# Patient Record
Sex: FEMALE | Race: ASIAN | NOT HISPANIC OR LATINO | URBAN - METROPOLITAN AREA
[De-identification: names, ages, dates, MRNs, and addresses within clinical notes are randomized per-mention and may not be internally consistent; named-entity substitution may affect disease eponyms.]

---

## 2019-01-13 ENCOUNTER — WALK IN (OUTPATIENT)
Dept: URGENT CARE | Age: 29
End: 2019-01-13

## 2019-01-13 DIAGNOSIS — H93.8X1 CLOGGED EAR, RIGHT: Primary | ICD-10-CM

## 2019-01-13 DIAGNOSIS — J06.9 ACUTE URI: ICD-10-CM

## 2019-01-13 DIAGNOSIS — H61.20 IMPACTED CERUMEN, UNSPECIFIED LATERALITY: ICD-10-CM

## 2019-01-13 PROCEDURE — 99204 OFFICE O/P NEW MOD 45 MIN: CPT | Performed by: PEDIATRICS

## 2019-01-13 ASSESSMENT — PAIN SCALES - GENERAL: PAINLEVEL: 3-4

## 2020-01-27 ENCOUNTER — OFFICE VISIT (OUTPATIENT)
Dept: FAMILY MEDICINE CLINIC | Facility: CLINIC | Age: 30
End: 2020-01-27
Payer: COMMERCIAL

## 2020-01-27 VITALS
WEIGHT: 93 LBS | BODY MASS INDEX: 18.75 KG/M2 | HEIGHT: 59.25 IN | DIASTOLIC BLOOD PRESSURE: 60 MMHG | TEMPERATURE: 98 F | HEART RATE: 100 BPM | RESPIRATION RATE: 16 BRPM | SYSTOLIC BLOOD PRESSURE: 90 MMHG

## 2020-01-27 DIAGNOSIS — N30.01 ACUTE CYSTITIS WITH HEMATURIA: Primary | ICD-10-CM

## 2020-01-27 DIAGNOSIS — R35.0 URINE FREQUENCY: ICD-10-CM

## 2020-01-27 LAB
BILIRUBIN: NEGATIVE
GLUCOSE (URINE DIPSTICK): NEGATIVE MG/DL
KETONES (URINE DIPSTICK): NEGATIVE MG/DL
MULTISTIX LOT#: ABNORMAL NUMERIC
NITRITE, URINE: POSITIVE
PH, URINE: 6 (ref 4.5–8)
PROTEIN (URINE DIPSTICK): 300 MG/DL
SPECIFIC GRAVITY: 1.02 (ref 1–1.03)
URINE-COLOR: YELLOW
UROBILINOGEN,SEMI-QN: 0.2 MG/DL (ref 0–1.9)

## 2020-01-27 PROCEDURE — 87086 URINE CULTURE/COLONY COUNT: CPT | Performed by: FAMILY MEDICINE

## 2020-01-27 PROCEDURE — 87186 SC STD MICRODIL/AGAR DIL: CPT | Performed by: FAMILY MEDICINE

## 2020-01-27 PROCEDURE — 81003 URINALYSIS AUTO W/O SCOPE: CPT | Performed by: FAMILY MEDICINE

## 2020-01-27 PROCEDURE — 87077 CULTURE AEROBIC IDENTIFY: CPT | Performed by: FAMILY MEDICINE

## 2020-01-27 PROCEDURE — 99203 OFFICE O/P NEW LOW 30 MIN: CPT | Performed by: FAMILY MEDICINE

## 2020-01-27 RX ORDER — NITROFURANTOIN 25; 75 MG/1; MG/1
100 CAPSULE ORAL 2 TIMES DAILY
Qty: 10 CAPSULE | Refills: 0 | Status: SHIPPED | OUTPATIENT
Start: 2020-01-27 | End: 2020-02-11 | Stop reason: ALTCHOICE

## 2020-01-27 NOTE — PROGRESS NOTES
Chief Complaint:  Patient presents with:  Establish Care: New Pt  Urinary Symptoms: Urine Frequency and Burning Sensation     HPI:  This is a 30year old female patient presenting for Establish Care (New Pt) and Urinary Symptoms (Urine Frequency and Burnin rebound tenderness  EXTREMITIES: warm and well perfused  PSYCH: pleasant and cooperative, no obvious depression or anxiety    Results for orders placed or performed in visit on 01/27/20   URINALYSIS, AUTO, W/O SCOPE   Result Value Ref Range    Glucose Urin

## 2020-01-28 ENCOUNTER — TELEPHONE (OUTPATIENT)
Dept: FAMILY MEDICINE CLINIC | Facility: CLINIC | Age: 30
End: 2020-01-28

## 2020-01-28 NOTE — TELEPHONE ENCOUNTER
Patient was seen yesterday and prescribed an antibiotic but did not inform Dr. Martha Driver that she was breastfeeding and according to the pharmacist she should not take this, looking for a different antibiotic

## 2020-01-28 NOTE — TELEPHONE ENCOUNTER
Reviewed the literature again and this is ok to use while breast feeding as long as infant is >3month old. If she is still uncomfortable with this, please let me know.    Tish Luz, DO

## 2020-02-11 ENCOUNTER — OFFICE VISIT (OUTPATIENT)
Dept: FAMILY MEDICINE CLINIC | Facility: CLINIC | Age: 30
End: 2020-02-11
Payer: COMMERCIAL

## 2020-02-11 VITALS
RESPIRATION RATE: 16 BRPM | WEIGHT: 92 LBS | SYSTOLIC BLOOD PRESSURE: 96 MMHG | TEMPERATURE: 98 F | HEART RATE: 80 BPM | BODY MASS INDEX: 18.3 KG/M2 | HEIGHT: 59.5 IN | DIASTOLIC BLOOD PRESSURE: 60 MMHG

## 2020-02-11 DIAGNOSIS — L04.9 LYMPHADENITIS, ACUTE: Primary | ICD-10-CM

## 2020-02-11 PROCEDURE — 99214 OFFICE O/P EST MOD 30 MIN: CPT | Performed by: FAMILY MEDICINE

## 2020-02-11 RX ORDER — AMOXICILLIN 500 MG/1
500 CAPSULE ORAL 2 TIMES DAILY
Qty: 20 CAPSULE | Refills: 0 | Status: SHIPPED | OUTPATIENT
Start: 2020-02-11 | End: 2020-02-21

## 2020-02-11 NOTE — PROGRESS NOTES
Chief Complaint:  Patient presents with:  Lymph Node: x 4 days Swollen Lymph on Left Side of Neck     HPI:  This is a 30year old female patient presenting for Lymph Node (x 4 days Swollen Lymph on Left Side of Neck )    Symptoms started 4 days ago.  Noted wheezes, rales or rhonchi, good air movement  CV: HRRR, no murmurs, no peripheral edema   EXTREMITIES: warm and well perfused  PSYCH: pleasant and cooperative, no obvious depression or anxiety    ASSESSMENT AND PLAN:  Discussed the following assessment   P

## 2020-03-02 ENCOUNTER — OFFICE VISIT (OUTPATIENT)
Dept: FAMILY MEDICINE CLINIC | Facility: CLINIC | Age: 30
End: 2020-03-02
Payer: COMMERCIAL

## 2020-03-02 VITALS
OXYGEN SATURATION: 98 % | TEMPERATURE: 98 F | BODY MASS INDEX: 18.5 KG/M2 | RESPIRATION RATE: 16 BRPM | WEIGHT: 93 LBS | HEIGHT: 59.5 IN | DIASTOLIC BLOOD PRESSURE: 58 MMHG | HEART RATE: 82 BPM | SYSTOLIC BLOOD PRESSURE: 94 MMHG

## 2020-03-02 DIAGNOSIS — J06.9 ACUTE URI: Primary | ICD-10-CM

## 2020-03-02 PROCEDURE — 99213 OFFICE O/P EST LOW 20 MIN: CPT | Performed by: NURSE PRACTITIONER

## 2020-03-02 NOTE — PATIENT INSTRUCTIONS
Gargle with warm salt water solution 3-5 times daily. Dissolve 1/2 teaspoon salt in half cup of warm tap water. Gargle and spit.      Try a premixed saline nasal spray, available over the counter, such as Barton Nasal Spray (or generic equi

## 2020-03-02 NOTE — PROGRESS NOTES
Patient presents with:  Cough: is till coughing, has a sore throat, rigth ear feels closed       HPI:  Presents with 7 day history of right ear pressure (w/o pain), cough with production of clear/white colored sputum, sinus congestion, sore throat \"from c No rash noted. No erythema. No pallor. A/P:    Acute uri  (primary encounter diagnosis)- likely viral illness, already reporting some improvement.  Do routine nasal saline sinus rinses, warm salt water gargles, as per patient instructions, along with burnett

## 2021-01-13 ENCOUNTER — TELEPHONE (OUTPATIENT)
Dept: FAMILY MEDICINE CLINIC | Facility: CLINIC | Age: 31
End: 2021-01-13

## 2021-01-13 RX ORDER — AMOXICILLIN 500 MG/1
500 CAPSULE ORAL 3 TIMES DAILY
COMMUNITY
Start: 2020-12-17 | End: 2021-01-18 | Stop reason: ALTCHOICE

## 2021-01-13 NOTE — TELEPHONE ENCOUNTER
Saw MD in [place] x 1 month ago , given amoxicillin x 10 days  Left side ear infection    Pt feels like ear ache is continuing and would like to see MD  Made apt for OV 1/18 with Anderson

## 2021-01-13 NOTE — TELEPHONE ENCOUNTER
Called [facility] to call back about visit she scheduled.  It does not look like we have seen her from 03/20/2020

## 2021-01-18 ENCOUNTER — OFFICE VISIT (OUTPATIENT)
Dept: FAMILY MEDICINE CLINIC | Facility: CLINIC | Age: 31
End: 2021-01-18
Payer: COMMERCIAL

## 2021-01-18 VITALS
RESPIRATION RATE: 14 BRPM | WEIGHT: 102 LBS | BODY MASS INDEX: 20.56 KG/M2 | OXYGEN SATURATION: 97 % | HEIGHT: 59 IN | DIASTOLIC BLOOD PRESSURE: 60 MMHG | TEMPERATURE: 98 F | HEART RATE: 94 BPM | SYSTOLIC BLOOD PRESSURE: 108 MMHG

## 2021-01-18 DIAGNOSIS — H65.93 MUCOID OTITIS MEDIA OF BOTH EARS WITH EFFUSION: Primary | ICD-10-CM

## 2021-01-18 PROCEDURE — 3074F SYST BP LT 130 MM HG: CPT | Performed by: FAMILY MEDICINE

## 2021-01-18 PROCEDURE — 3008F BODY MASS INDEX DOCD: CPT | Performed by: FAMILY MEDICINE

## 2021-01-18 PROCEDURE — 3078F DIAST BP <80 MM HG: CPT | Performed by: FAMILY MEDICINE

## 2021-01-18 PROCEDURE — 99213 OFFICE O/P EST LOW 20 MIN: CPT | Performed by: FAMILY MEDICINE

## 2021-01-18 NOTE — PROGRESS NOTES
Chief Complaint:  Patient presents with:  Ear Problem: Feeling full in both ears worse on the Left Ear, was given Amoxicillin x 1 month ago in [place]    HPI:  This is a 35year old female patient presenting for Ear Problem (Feeling full in both ears worse on movement  CV: HRRR, no murmurs, no peripheral edema   EXTREMITIES: warm and well perfused  PSYCH: pleasant and cooperative, no obvious depression or anxiety    ASSESSMENT AND PLAN:  Discussed the following assessment   Problem List Items Addressed This [facility]

## 2021-02-20 ENCOUNTER — OFFICE VISIT (OUTPATIENT)
Dept: FAMILY MEDICINE CLINIC | Facility: CLINIC | Age: 31
End: 2021-02-20
Payer: COMMERCIAL

## 2021-02-20 VITALS
HEIGHT: 59.5 IN | TEMPERATURE: 99 F | RESPIRATION RATE: 14 BRPM | HEART RATE: 92 BPM | DIASTOLIC BLOOD PRESSURE: 70 MMHG | SYSTOLIC BLOOD PRESSURE: 104 MMHG | WEIGHT: 101 LBS | BODY MASS INDEX: 20.09 KG/M2

## 2021-02-20 DIAGNOSIS — R19.09 GROIN LUMP: Primary | ICD-10-CM

## 2021-02-20 PROCEDURE — 3078F DIAST BP <80 MM HG: CPT | Performed by: FAMILY MEDICINE

## 2021-02-20 PROCEDURE — 99213 OFFICE O/P EST LOW 20 MIN: CPT | Performed by: FAMILY MEDICINE

## 2021-02-20 PROCEDURE — 3074F SYST BP LT 130 MM HG: CPT | Performed by: FAMILY MEDICINE

## 2021-02-20 PROCEDURE — 3008F BODY MASS INDEX DOCD: CPT | Performed by: FAMILY MEDICINE

## 2021-02-20 NOTE — PROGRESS NOTES
Chief Complaint:  Patient presents with:  Bump: Pt c/o of bump underneath skin for 2 months. Painful to touch    HPI:  This is a 35year old female patient presenting for Bump (Pt c/o of bump underneath skin for 2 months.   Painful to touch)    Present for or anxiety    ASSESSMENT AND PLAN:  Discussed the following assessment   Problem List Items Addressed This Visit     None      Visit Diagnoses     Groin lump    -  Primary    Relevant Orders    US GROIN LEFT LIMITED SH(CPT=76882)          Advised the follo

## 2021-03-26 ENCOUNTER — TELEPHONE (OUTPATIENT)
Dept: FAMILY MEDICINE CLINIC | Facility: CLINIC | Age: 31
End: 2021-03-26

## 2021-03-31 NOTE — TELEPHONE ENCOUNTER
Left message for patient to call us back so we may assist with scheduling patients appointment for physical and pap

## 2021-05-26 VITALS
DIASTOLIC BLOOD PRESSURE: 72 MMHG | RESPIRATION RATE: 18 BRPM | HEART RATE: 94 BPM | SYSTOLIC BLOOD PRESSURE: 102 MMHG | OXYGEN SATURATION: 98 % | TEMPERATURE: 98.8 F